# Patient Record
Sex: FEMALE | Race: WHITE | NOT HISPANIC OR LATINO | Employment: UNEMPLOYED | URBAN - METROPOLITAN AREA
[De-identification: names, ages, dates, MRNs, and addresses within clinical notes are randomized per-mention and may not be internally consistent; named-entity substitution may affect disease eponyms.]

---

## 2018-01-11 ENCOUNTER — HISTORICAL (OUTPATIENT)
Dept: RADIOLOGY | Facility: HOSPITAL | Age: 58
End: 2018-01-11

## 2018-01-22 ENCOUNTER — HISTORICAL (OUTPATIENT)
Dept: INTERNAL MEDICINE | Facility: CLINIC | Age: 58
End: 2018-01-22

## 2018-02-20 ENCOUNTER — HISTORICAL (OUTPATIENT)
Dept: RADIOLOGY | Facility: HOSPITAL | Age: 58
End: 2018-02-20

## 2018-05-04 ENCOUNTER — HISTORICAL (OUTPATIENT)
Dept: ENDOSCOPY | Facility: HOSPITAL | Age: 58
End: 2018-05-04

## 2018-06-11 ENCOUNTER — HISTORICAL (OUTPATIENT)
Dept: INFUSION THERAPY | Facility: HOSPITAL | Age: 58
End: 2018-06-11

## 2018-06-18 ENCOUNTER — HISTORICAL (OUTPATIENT)
Dept: INFUSION THERAPY | Facility: HOSPITAL | Age: 58
End: 2018-06-18

## 2018-06-22 ENCOUNTER — HISTORICAL (OUTPATIENT)
Dept: INFUSION THERAPY | Facility: HOSPITAL | Age: 58
End: 2018-06-22

## 2022-01-03 ENCOUNTER — HISTORICAL (OUTPATIENT)
Dept: ADMINISTRATIVE | Facility: HOSPITAL | Age: 62
End: 2022-01-03

## 2022-01-03 LAB — SARS-COV-2 RNA RESP QL NAA+PROBE: NEGATIVE

## 2022-03-08 ENCOUNTER — HISTORICAL (OUTPATIENT)
Dept: ADMINISTRATIVE | Facility: HOSPITAL | Age: 62
End: 2022-03-08

## 2022-04-10 ENCOUNTER — HISTORICAL (OUTPATIENT)
Dept: ADMINISTRATIVE | Facility: HOSPITAL | Age: 62
End: 2022-04-10

## 2022-04-25 VITALS
DIASTOLIC BLOOD PRESSURE: 81 MMHG | BODY MASS INDEX: 28.8 KG/M2 | HEIGHT: 61 IN | SYSTOLIC BLOOD PRESSURE: 125 MMHG | OXYGEN SATURATION: 98 % | WEIGHT: 152.56 LBS

## 2022-05-03 NOTE — HISTORICAL [facility]
This is a historical note converted from [employer]. Formatting and pictures may have been removed.  Please reference [employer] for original formatting and attached multimedia. UHOP?(Verified)  ?  DATE OF SURGERY: ? ?05/04/2018  ?  SURGEON: ?Dr. Lissett Gilbert III, MD, MD  ?  RESIDENT SURGEON:??Shivani Mcneill MD, PGY2  ?  PREOPERATIVE DIAGNOSIS: ?  1.? Gastroesophageal reflux disease.  2.? Hematemesis.  ?  POSTOPERATIVE DIAGNOSES: ?  1.??Reflux esophagitis.  2.??Erosive gastritis.  ?  PROCEDURE PERFORMED: ?Esophagogastroduodenoscopy with biopsies.  ?  ANESTHESIA: ?Sedation with propofol.  ?  INDICATION FOR PROCEDURE: ?The patient is a 57-year-old female who?was referred?for upper endoscopy to evaluate symptoms of gastroesophageal reflux and hematemesis.  ?  PROCEDURE IN DETAIL: ?After appropriate informed consent had been obtained and all questions had been answered, the patient was brought to the procedure room. ?She was connected to the appropriate monitoring devices. ?A time-out was performed verifying correct patient and procedure. ?The patient was then placed in the left lateral decubitus position. ?A bite block was placed. ?IV sedation was administered by the anesthesia team in divided doses throughout the procedure. ?Once the appropriate level of sedation had been achieved, the procedure was begun. ?A well-lubricated endoscope was inserted into the mouth and advanced over the tongue and through the esophagus, stomach, and duodenum under direct visualization. ?The scope was advanced to the second portion of the duodenum where the ampulla was visualized. ?The scope was then slowly withdrawn while inspecting the color, texture, and integrity of the mucosa. ?No duodenal mucosal abnormalities were seen. ?The scope was then withdrawn through the pylorus. ?The patient was noted to have some erosive gastritis in area of the antrum. ?Cold biopsies were taken of this area. ?The scope was then retroflexed.??She had a very small  hiatal hernia but otherwise no abnormalities. ?The scope was then withdrawn through the GE junction. ?The Z-line appeared normal. ?There was some esophagitis of the distal esophagus. ?Biopsies of this area?were taken using cold biopsy forceps.??No other esophageal abnormalities were seen.? The scope was then fully withdrawn and the procedure complete. ?The patient was awakened from anesthesia and transferred to the recovery room in stable condition.  ?  COMPLICATIONS: ?None.  ?  ESTIMATED BLOOD LOSS: ?Minimal.  ?  SPECIMENS: ?  1.??Cold biopsies of stomach antrum.  2.??Cold biopsies of distal esophagus.  ?  FOLLOWUP:??Patient does not need repeat upper endoscopy unless clinically indicated. Depending on biopsy results, she may require treatment of H. pylori.  ?  ?

## 2022-05-03 NOTE — HISTORICAL [facility]
This is a historical note converted from [employer]. Formatting and pictures may have been removed.  Please reference [employer] for original formatting and attached multimedia. UHOP?(Verified)  ?  DATE OF SURGERY: ? ?05/04/2018  ?  ATTENDING PHYSICIAN:??Dr. Lissett Gilbert III, MD, MD  ?  RESIDENT SURGEON: ?Shivani Mcneill MD, PGY-2  ?  PREOPERATIVE DIAGNOSIS: ?  1.? History of colon polyps.  2.? Hematochezia.  ?  POSTOPERATIVE DIAGNOSES: ?  1.??Colon polyps.  2.??Diverticulosis.  ?  PROCEDURE PERFORMED:??Colonoscopy with snare polypectomy.  ?  INDICATION FOR PROCEDURE: ?The patient is a 57-year-old female who has a personal history of colon polyps found on colonoscopy?over 10?years ago.??She has not had repeat colonoscopy since then.? She reports some occasional?bright red blood per rectum but otherwise no changes in bowel habits.? She denies any known family history of colon cancer.  ?  ANESTHESIA: ?Sedation with propofol. ?  ?  BOWEL PREPARATION:??Good.  ?  PROCEDURE IN DETAIL: ?After appropriate informed consent had been obtained and all questions had been answered, the patient was brought to the procedure room. ?She was connected to the appropriate monitoring devices. ?A time-out was performed verifying correct patient and procedure. ?Oxygen was administered to the patient continuously via nasal cannula. ?She was then placed in the left lateral decubitus position. ?IV sedation was administered by the anesthesia team in divided dosages throughout the procedure. ?Once the appropriate level of sedation had been achieved, the procedure was begun. ?A digital rectal exam was performed. ?No masses were felt and there was no gross blood. ?A well-lubricated colonoscope was then inserted into the rectum and advanced under direct visualization to the level of the cecum. ?The cecum was identified by seeing the appendiceal orifice and ileocecal valve. ?The scope was then slowly withdrawn while inspecting the color, texture, and  integrity of the mucosa from the cecum to the anal canal. ?The patient had a 5 mm polyp within the cecum that was removed using cold snare.? She had an additional 5 mm polyp within the transverse colon that was also removed using cold snare.? The patient had numerous diverticula in the descending colon as well. ?Upon reaching the rectum, the scope was retroflexed.? No polyps, masses, or other abnormalities of the distal rectum were seen.? The scope was then fully withdrawn and the procedure complete. ?The patient was awakened from anesthesia and transferred to the recovery room in stable condition.  ?  COMPLICATIONS: ?None.  ?  ESTIMATED BLOOD LOSS:??Minimal.  ?  SPECIMENS:  1.? Cecal polyp.  2.? Transverse colon polyp.  ?  FOLLOWUP: ?The patient will require repeat colonoscopy in 5 years.  ?

## 2023-04-17 DIAGNOSIS — R11.0 NAUSEA: ICD-10-CM

## 2023-04-17 DIAGNOSIS — K21.9 GASTROESOPHAGEAL REFLUX DISEASE, UNSPECIFIED WHETHER ESOPHAGITIS PRESENT: Primary | ICD-10-CM

## 2023-07-05 DIAGNOSIS — K92.1 HEMATOCHEZIA: Primary | ICD-10-CM

## 2023-07-05 DIAGNOSIS — K29.71 GASTRITIS WITH HEMORRHAGE, UNSPECIFIED CHRONICITY, UNSPECIFIED GASTRITIS TYPE: ICD-10-CM

## 2024-01-08 ENCOUNTER — OFFICE VISIT (OUTPATIENT)
Dept: URGENT CARE | Facility: CLINIC | Age: 64
End: 2024-01-08
Payer: MEDICAID

## 2024-01-08 VITALS
SYSTOLIC BLOOD PRESSURE: 124 MMHG | HEART RATE: 87 BPM | WEIGHT: 164 LBS | HEIGHT: 62 IN | OXYGEN SATURATION: 98 % | TEMPERATURE: 100 F | BODY MASS INDEX: 30.18 KG/M2 | DIASTOLIC BLOOD PRESSURE: 80 MMHG | RESPIRATION RATE: 16 BRPM

## 2024-01-08 DIAGNOSIS — U07.1 COVID-19 VIRUS DETECTED: ICD-10-CM

## 2024-01-08 DIAGNOSIS — U07.1 COVID-19: Primary | ICD-10-CM

## 2024-01-08 DIAGNOSIS — R09.89 SYMPTOMS OF UPPER RESPIRATORY INFECTION (URI): ICD-10-CM

## 2024-01-08 LAB
CTP QC/QA: YES
MOLECULAR STREP A: NEGATIVE
POC MOLECULAR INFLUENZA A AGN: NEGATIVE
POC MOLECULAR INFLUENZA B AGN: NEGATIVE
SARS-COV-2 RDRP RESP QL NAA+PROBE: POSITIVE

## 2024-01-08 PROCEDURE — 87502 INFLUENZA DNA AMP PROBE: CPT | Mod: PBBFAC | Performed by: FAMILY MEDICINE

## 2024-01-08 PROCEDURE — 87635 SARS-COV-2 COVID-19 AMP PRB: CPT | Mod: PBBFAC | Performed by: FAMILY MEDICINE

## 2024-01-08 PROCEDURE — 99213 OFFICE O/P EST LOW 20 MIN: CPT | Mod: PBBFAC | Performed by: FAMILY MEDICINE

## 2024-01-08 PROCEDURE — 99203 OFFICE O/P NEW LOW 30 MIN: CPT | Mod: S$PBB,,, | Performed by: FAMILY MEDICINE

## 2024-01-08 PROCEDURE — 87651 STREP A DNA AMP PROBE: CPT | Mod: PBBFAC | Performed by: FAMILY MEDICINE

## 2024-01-08 RX ORDER — HYDROCODONE BITARTRATE AND ACETAMINOPHEN 5; 325 MG/1; MG/1
1 TABLET ORAL EVERY 6 HOURS PRN
Qty: 12 TABLET | Refills: 0 | Status: SHIPPED | OUTPATIENT
Start: 2024-01-08

## 2024-01-08 NOTE — PROGRESS NOTES
"Subjective:       Patient ID: Sherry Hampton is a 63 y.o. female.    Vitals:  height is 5' 2" (1.575 m) and weight is 74.4 kg (164 lb). Her temperature is 99.5 °F (37.5 °C). Her blood pressure is 124/80 and her pulse is 87. Her respiration is 16 and oxygen saturation is 98%.     Chief Complaint: URI (Hoarse, sneezing, runny nose since last night. Bilat leg pain (states legs hurts post starting new job x 2 days.))    URI   The current episode started yesterday. Associated symptoms include congestion and sneezing. Pertinent negatives include no abdominal pain, chest pain, coughing, diarrhea, neck pain, rash, vomiting or wheezing. Associated symptoms comments: Myalgias, states she thinks it is from work..         HENT:  Positive for congestion. Negative for ear discharge, drooling, facial swelling and trouble swallowing.    Neck: Negative for neck pain and neck stiffness.   Cardiovascular:  Negative for chest pain.   Respiratory:  Negative for cough, bloody sputum, shortness of breath and wheezing.    Gastrointestinal:  Negative for abdominal pain, vomiting and diarrhea.   Skin:  Negative for rash.   Allergic/Immunologic: Positive for sneezing.       Objective:   Physical Exam   Constitutional: She appears well-developed.  Non-toxic appearance. She does not appear ill. No distress.   HENT:   Head: Atraumatic.   Nose: No purulent discharge. Right sinus exhibits no maxillary sinus tenderness and no frontal sinus tenderness. Left sinus exhibits no maxillary sinus tenderness and no frontal sinus tenderness.   Mouth/Throat: Uvula is midline.   Eyes: Right eye exhibits no discharge. Left eye exhibits no discharge. Extraocular movement intact   Neck: Neck supple. No neck rigidity present.   Cardiovascular: Regular rhythm.   Pulmonary/Chest: Effort normal and breath sounds normal. No respiratory distress. She has no wheezes. She has no rales.   Musculoskeletal: Normal range of motion.         General: No swelling, " tenderness, deformity or signs of injury. Normal range of motion.      Right lower leg: No edema.      Left lower leg: No edema.   Lymphadenopathy:     She has no cervical adenopathy.   Neurological: She is alert.   Skin: Skin is warm, dry and not diaphoretic.   Psychiatric: Her behavior is normal.   Nursing note and vitals reviewed.      Results for orders placed or performed in visit on 01/08/24   POCT COVID-19 Rapid Screening   Result Value Ref Range    POC Rapid COVID Positive (A) Negative     Acceptable Yes    POCT Influenza A/B Molecular   Result Value Ref Range    POC Molecular Influenza A Ag Negative Negative, Not Reported    POC Molecular Influenza B Ag Negative Negative, Not Reported     Acceptable Yes    POCT Strep A, Molecular   Result Value Ref Range    Molecular Strep A, POC Negative Negative     Acceptable Yes        Assessment:     1. COVID-19    2. Symptoms of upper respiratory infection (URI)          Plan:   Not interested in Paxlovid. Discussed COVID-19 isolation instructions, contagious precautions, ER precautions.  Use medications as needed for symptoms.  Agreed to prescribe a few Norco as requested.  She states she is tolerated this in the past.   reviewed, no records available.    Please follow instructions on patient education material. Seek care immediately if new or worsening symptoms develop.    COVID-19    Symptoms of upper respiratory infection (URI)  -     POCT COVID-19 Rapid Screening  -     POCT Influenza A/B Molecular  -     POCT Strep A, Molecular    Other orders  -     HYDROcodone-acetaminophen (NORCO) 5-325 mg per tablet; Take 1 tablet by mouth every 6 (six) hours as needed for Pain.  Dispense: 12 tablet; Refill: 0        Please note: This chart was completed via voice to text dictation. It may contain typographical/word recognition errors. If there are any questions, please contact the provider for final clarification.

## 2024-01-08 NOTE — LETTER
January 8, 2024      [facility]  [address] [address]  Phone: [phone]       Patient: Sherry Hampton   YOB: 1960  Date of Visit: 01/08/2024    To Whom It May Concern:    Kelly Hampton  was at [facility] on 01/08/2024. The patient may return to work/school on 1/13/24 with no restrictions. If you have any questions or concerns, or if I can be of further assistance, please do not hesitate to contact me.    Sincerely,    JENNIFER Gomez MD

## 2024-01-10 ENCOUNTER — OFFICE VISIT (OUTPATIENT)
Dept: URGENT CARE | Facility: CLINIC | Age: 64
End: 2024-01-10
Payer: MEDICAID

## 2024-01-10 VITALS
WEIGHT: 164 LBS | RESPIRATION RATE: 20 BRPM | BODY MASS INDEX: 30.18 KG/M2 | DIASTOLIC BLOOD PRESSURE: 66 MMHG | HEART RATE: 83 BPM | HEIGHT: 62 IN | SYSTOLIC BLOOD PRESSURE: 95 MMHG | TEMPERATURE: 99 F | OXYGEN SATURATION: 97 %

## 2024-01-10 DIAGNOSIS — R09.81 NASAL CONGESTION: Primary | ICD-10-CM

## 2024-01-10 DIAGNOSIS — J02.9 SORE THROAT: ICD-10-CM

## 2024-01-10 DIAGNOSIS — R05.9 COUGH IN ADULT PATIENT: ICD-10-CM

## 2024-01-10 PROCEDURE — 99204 OFFICE O/P NEW MOD 45 MIN: CPT | Mod: S$PBB,,,

## 2024-01-10 PROCEDURE — 99214 OFFICE O/P EST MOD 30 MIN: CPT | Mod: PBBFAC

## 2024-01-10 RX ORDER — PROMETHAZINE HYDROCHLORIDE AND DEXTROMETHORPHAN HYDROBROMIDE 6.25; 15 MG/5ML; MG/5ML
5 SYRUP ORAL NIGHTLY PRN
Qty: 50 ML | Refills: 0 | Status: SHIPPED | OUTPATIENT
Start: 2024-01-10 | End: 2024-01-20

## 2024-01-10 RX ORDER — FLUTICASONE PROPIONATE 50 MCG
1 SPRAY, SUSPENSION (ML) NASAL DAILY
Qty: 11.1 ML | Refills: 0 | Status: SHIPPED | OUTPATIENT
Start: 2024-01-10

## 2024-01-10 RX ORDER — PHENOL 1.4 %
AEROSOL, SPRAY (ML) MUCOUS MEMBRANE
Qty: 177 ML | Refills: 0 | Status: SHIPPED | OUTPATIENT
Start: 2024-01-10

## 2024-01-10 NOTE — PROGRESS NOTES
"Subjective:       Patient ID: Sherry Hampton is a 63 y.o. female.    Vitals:  height is 5' 2" (1.575 m) and weight is 74.4 kg (164 lb). Her oral temperature is 98.5 °F (36.9 °C). Her blood pressure is 95/66 and her pulse is 83. Her respiration is 20 and oxygen saturation is 97%.     Chief Complaint: Covid (Patient diagnosed with Covid on 1/8/2024. Complains of body aches, headache and sore throat)    63-year-old white female presents to clinic alone.  Reports diagnosed with COVID 2 days ago, has been taking Norco/Ibuprofen with minimal improvement.  Reports infrequent productive coughing with rib pain produced with cough.          HENT:  Positive for congestion and sore throat.    Neck: Negative for neck pain.   Cardiovascular:  Negative for palpitations and sob on exertion.   Respiratory:  Positive for cough and sputum production. Negative for shortness of breath and asthma.    Musculoskeletal:  Positive for back pain and muscle ache.   Allergic/Immunologic: Negative for asthma.       Objective:      Physical Exam   Constitutional: She is oriented to person, place, and time. She is cooperative. She is easily aroused. She does not appear ill. No distress.      Comments:Skin turgor elastic    awake  HENT:   Head: Normocephalic and atraumatic.   Ears:   Right Ear: A middle ear effusion is present.   Left Ear: Tympanic membrane normal.   Nose: Mucosal edema and rhinorrhea present. Right sinus exhibits no maxillary sinus tenderness and no frontal sinus tenderness. Left sinus exhibits no maxillary sinus tenderness and no frontal sinus tenderness.   Mouth/Throat: Mucous membranes are normal. Abnormal dentition. Dental caries present. Posterior oropharyngeal erythema present. Tonsils are 1+ on the right. Tonsils are 1+ on the left. No tonsillar exudate.   Eyes: Conjunctivae and lids are normal.   Neck: Neck supple.      Comments: Left submandibular pain with palpation   Cardiovascular: Normal rate.   Pulmonary/Chest: " Effort normal and breath sounds normal.   Abdominal: Normal appearance.   Musculoskeletal:      Right lower leg: No edema.      Left lower leg: No edema.   Lymphadenopathy:     She has no cervical adenopathy.   Neurological: no focal deficit. She is alert, oriented to person, place, and time and easily aroused. Gait normal. GCS eye subscore is 4. GCS verbal subscore is 5. GCS motor subscore is 6.   Skin: Skin is warm, dry and intact. Capillary refill takes less than 2 seconds.   Psychiatric: Her speech is normal. Her mood appears anxious. Her affect is tearful.   Nursing note and vitals reviewed.        Assessment:       1. Nasal congestion    2. Cough in adult patient    3. Sore throat          Plan:     Patient has COVID, instructed patient to refrain from any NSAIDs regarding extensive GI history.  We will prescribe nightly cough syrup to help with coughing symptoms and sleep difficulties.  Instructed patient to increase her oral fluid intake, may use saltwater rinses, encouraged to drink warm teas with honey and lemon.  Follow up with PCP in 7 days if symptoms worsen.    Nasal congestion  -     fluticasone propionate (FLONASE) 50 mcg/actuation nasal spray; 1 spray (50 mcg total) by Each Nostril route once daily.  Dispense: 11.1 mL; Refill: 0    Cough in adult patient  -     promethazine-dextromethorphan (PROMETHAZINE-DM) 6.25-15 mg/5 mL Syrp; Take 5 mLs by mouth nightly as needed (nightly cough).  Dispense: 50 mL; Refill: 0    Sore throat  -     phenoL (CHLORASEPTIC THROAT SPRAY) 1.4 % SprA; by Mucous Membrane route every 2 (two) hours.  Dispense: 177 mL; Refill: 0

## 2024-02-08 DIAGNOSIS — M25.511 RIGHT ANTERIOR SHOULDER PAIN: Primary | ICD-10-CM

## 2024-02-24 ENCOUNTER — OFFICE VISIT (OUTPATIENT)
Dept: URGENT CARE | Facility: CLINIC | Age: 64
End: 2024-02-24
Payer: MEDICAID

## 2024-02-24 VITALS
SYSTOLIC BLOOD PRESSURE: 94 MMHG | HEART RATE: 92 BPM | TEMPERATURE: 98 F | WEIGHT: 165.13 LBS | OXYGEN SATURATION: 97 % | DIASTOLIC BLOOD PRESSURE: 55 MMHG | BODY MASS INDEX: 30.39 KG/M2 | RESPIRATION RATE: 20 BRPM | HEIGHT: 62 IN

## 2024-02-24 DIAGNOSIS — R05.9 COUGH, UNSPECIFIED TYPE: Primary | ICD-10-CM

## 2024-02-24 PROCEDURE — 99213 OFFICE O/P EST LOW 20 MIN: CPT | Mod: S$PBB,,, | Performed by: FAMILY MEDICINE

## 2024-02-24 PROCEDURE — 99214 OFFICE O/P EST MOD 30 MIN: CPT | Mod: PBBFAC | Performed by: FAMILY MEDICINE

## 2024-02-24 RX ORDER — DULOXETIN HYDROCHLORIDE 30 MG/1
CAPSULE, DELAYED RELEASE ORAL
COMMUNITY
End: 2024-02-28

## 2024-02-24 RX ORDER — PROMETHAZINE HYDROCHLORIDE AND DEXTROMETHORPHAN HYDROBROMIDE 6.25; 15 MG/5ML; MG/5ML
5 SYRUP ORAL
Qty: 120 ML | Refills: 0 | Status: SHIPPED | OUTPATIENT
Start: 2024-02-24 | End: 2024-02-28

## 2024-02-24 RX ORDER — DICLOFENAC SODIUM 75 MG/1
TABLET, DELAYED RELEASE ORAL
COMMUNITY
End: 2024-02-28

## 2024-02-24 RX ORDER — HYDROXYZINE PAMOATE 25 MG/1
CAPSULE ORAL
COMMUNITY
End: 2024-02-28

## 2024-02-24 NOTE — PROGRESS NOTES
"Subjective:       Patient ID: Sherry Hampton is a 63 y.o. female.    Vitals:  height is 5' 2" (1.575 m) and weight is 74.9 kg (165 lb 2 oz). Her temperature is 98.3 °F (36.8 °C). Her blood pressure is 94/55 (abnormal) and her pulse is 92. Her respiration is 20 and oxygen saturation is 97%.     Chief Complaint: Cough (   X 4 days            COVID + X 1MO AGO    STATES ONLY COUGHS AT NIGHT, REFUSES TESTING)    Recent COVID-19, fully recovered, only cough continues.  No chest pain or shortness of breath, no wheezing.  She is moving to [place] and is requesting a refill on Phenergan DM.  Chart reviewed    Cough  The cough is Non-productive. Pertinent negatives include no chest pain, fever, hemoptysis, nasal congestion, postnasal drip, rash, rhinorrhea, sore throat, shortness of breath or wheezing.         Constitution: Negative for fever.   HENT:  Negative for postnasal drip and sore throat.    Cardiovascular:  Negative for chest pain.   Respiratory:  Positive for cough. Negative for bloody sputum, shortness of breath and wheezing.    Skin:  Negative for rash.       Objective:   Physical Exam   Constitutional: She appears well-developed.  Non-toxic appearance. She does not appear ill. No distress.   HENT:   Head: Atraumatic.   Nose: No rhinorrhea or purulent discharge. Right sinus exhibits no maxillary sinus tenderness and no frontal sinus tenderness. Left sinus exhibits no maxillary sinus tenderness and no frontal sinus tenderness.   Mouth/Throat: Uvula is midline.   Eyes: Right eye exhibits no discharge. Left eye exhibits no discharge. Extraocular movement intact   Neck: Neck supple. No neck rigidity present.   Cardiovascular: Regular rhythm.   Pulmonary/Chest: Effort normal and breath sounds normal. No stridor. No respiratory distress. She has no wheezes. She has no rhonchi. She has no rales.   Lymphadenopathy:     She has no cervical adenopathy.   Neurological: She is alert.   Skin: Skin is warm, dry and not " diaphoretic.   Psychiatric: Her behavior is normal.   Nursing note and vitals reviewed.        Assessment:     1. Cough, unspecified type          Plan:   Discussed what is likely a postviral cough.  Patient in no acute distress, vitals are stable, lungs are clear.  Agreed to refill Phenergan DM with side effect precautions.  Follow-up with providers in [place]    Cough, unspecified type  -     promethazine-dextromethorphan (PROMETHAZINE-DM) 6.25-15 mg/5 mL Syrp; Take 5 mLs by mouth every 6 to 8 hours as needed (cough). May cause sedation.  Do not drive or operate heavy machinery.  Dispense: 120 mL; Refill: 0        Please note: This chart was completed via voice to text dictation. It may contain typographical/word recognition errors. If there are any questions, please contact the provider for final clarification.

## 2024-02-28 ENCOUNTER — HOSPITAL ENCOUNTER (OUTPATIENT)
Dept: RADIOLOGY | Facility: HOSPITAL | Age: 64
Discharge: HOME | End: 2024-02-28
Attending: STUDENT IN AN ORGANIZED HEALTH CARE EDUCATION/TRAINING PROGRAM
Payer: MEDICAID

## 2024-02-28 ENCOUNTER — OFFICE VISIT (OUTPATIENT)
Dept: ORTHOPEDICS | Facility: CLINIC | Age: 64
End: 2024-02-28
Payer: MEDICAID

## 2024-02-28 VITALS
HEIGHT: 62 IN | DIASTOLIC BLOOD PRESSURE: 66 MMHG | HEART RATE: 68 BPM | SYSTOLIC BLOOD PRESSURE: 129 MMHG | WEIGHT: 165 LBS | BODY MASS INDEX: 30.36 KG/M2

## 2024-02-28 DIAGNOSIS — M25.511 RIGHT SHOULDER PAIN, UNSPECIFIED CHRONICITY: ICD-10-CM

## 2024-02-28 DIAGNOSIS — M75.101 ROTATOR CUFF SYNDROME OF RIGHT SHOULDER: Primary | ICD-10-CM

## 2024-02-28 PROCEDURE — 99213 OFFICE O/P EST LOW 20 MIN: CPT | Mod: PBBFAC,25

## 2024-02-28 PROCEDURE — 73030 X-RAY EXAM OF SHOULDER: CPT | Mod: TC,RT

## 2024-02-28 PROCEDURE — 20610 DRAIN/INJ JOINT/BURSA W/O US: CPT | Mod: PBBFAC,RT | Performed by: STUDENT IN AN ORGANIZED HEALTH CARE EDUCATION/TRAINING PROGRAM

## 2024-02-28 RX ORDER — MELOXICAM 15 MG/1
15 TABLET ORAL DAILY
Qty: 30 TABLET | Refills: 0 | Status: SHIPPED | OUTPATIENT
Start: 2024-02-28 | End: 2024-03-29

## 2024-02-28 RX ORDER — LIDOCAINE HYDROCHLORIDE 10 MG/ML
10 INJECTION, SOLUTION EPIDURAL; INFILTRATION; INTRACAUDAL; PERINEURAL
Status: ACTIVE | OUTPATIENT
Start: 2024-02-28

## 2024-02-28 RX ORDER — TRIAMCINOLONE ACETONIDE 40 MG/ML
40 INJECTION, SUSPENSION INTRA-ARTICULAR; INTRAMUSCULAR ONCE
Status: COMPLETED | OUTPATIENT
Start: 2024-02-28 | End: 2024-02-28

## 2024-02-28 RX ADMIN — TRIAMCINOLONE ACETONIDE 40 MG: 40 INJECTION, SUSPENSION INTRA-ARTICULAR; INTRAMUSCULAR at 02:02

## 2024-02-28 NOTE — PROGRESS NOTES
"Subjective:    Patient ID: Sherry Hampton is a right handed 63 y.o. female  who presented to [facility] for new visit..      Chief Complaint: Pain of the Right Shoulder      History of Present Illness:  HPI    Sherry Hampton who has a history of right rotator cuff syndrome  presented today for Chronic right shoulder pain present for 10 years. Pain is located in the anterior shoulder and radiates to the posterior shoulder. 4/10 in intensity, described as dull and aching. Worse with overhead movements and repetitive back and forth movements. She is tried Tylenol and ibuprofen, heating pad, topical Voltaren for the last 3 months with no improvement.     Shoulder Review of Systems:  Swelling?  no  Instability?  no  Clicking?  no  Limited ROM? no  Fever/Chills? no  Subluxation? no  Dislocation? no       Objective:      Physical Exam:    /66   Pulse 68   Ht 5' 2" (1.575 m)   Wt 74.8 kg (165 lb)   BMI 30.18 kg/m²     Ortho/SPM Exam    Appearance:  Soft tissue swelling: Left: no Right: no  Effusion: Left:  Negative Right: Negative  Erythema: Left no Right: no  Ecchymosis: Left: no Right: no  Atrophy: Left: no Right: no  Scapular winging: Left: no Right: no    Palpation:  Shoulder Tenderness: Left: none  Right: AC joint, biceps tendon, lateral acromion    Range of motion:  Flexion (0-90): Left:  90 Right: 90  Abduction (0-180): Left:  180 Right: 180  External rotation (0-55): Left: 55 Right: 55  Internal rotation (0-45): Left: 45 Right: 45    Strength:  Abduction: Left: 5/5 Pain: no Right: 5/5 Pain: yes  External rotation: Left: 5/5 Pain: no Right: 5/5 Pain: yes  Internal rotation: Left: 5/5 Pain: no Right: 5/5 Pain: no  Elbow flexion: Left: 5/5 Pain: no Right: 5/5 Pain: no  Elbow extension: Left: 5/5 Pain: no Right: 5/5 Pain: no    Special Tests:  Subacromial Impingement  Neer: Left: Negative Right: Positive  Osuna: Left: Negative Right: Negative    AC " Joint Arthritis:  Cross-body abduction: Left: Negative Right: Negative    Rotator Cuff Tear   Drop arm test: Left: Negative Right: Negative  Hornblower: Left: Left: Not performed Right: Positive   Belly press test: Left: Negative Right: Negative  Caitlin test (Empty can): Left: Negative Right: Positive    Biceps tendon/Labral tear  Speed's: Left: Negative Right: Negative  Yergason's: Left: Negative Right: Negative  O'Federico's: Left: Negative Right: Negative  Cranck test: Left: Negative Right: Negative    Stability   Sulcus sign: Left: Not performed Right: Not performed   Apprehension test: Left: Not performed Right: Not performed   Relocation test: Left: Not performed Right: Not performed     Cervical   Spurling: Left: Negative Right: Negative    AIN/PIN/Ulnar nerve: Intact and symmetric    General appearance: NAD  Peripheral pulses: normal bilaterally   Reflexes: Left: Not performed Right: Not performed   Sensation: normal    Labs:  Last A1c: The patient doesn't have any registry metric data available     Imaging:   Previous images reviewed.  X-rays ordered and performed today: yes  # of views: 4 Laterality: right  My Interpretation:  Mild acromioclavicular and glenohumeral arthrosis.    Assessment:        Encounter Diagnoses   Code Name Primary?    M75.101 Rotator cuff syndrome of right shoulder Yes        Plan:      MDM: Prior external referring provider notes reviewed. Prior external referring provider studies reviewed.   Dx: Right rotator cuff syndrome. Chronic in moderate exacerbation.  Treatment Plan: Discussed with patient diagnosis and treatment recommendations.   - Recommend conservative management, patient is amenable.  -patient amenable to SA/SD CSI  -recommend PT, formal script provided today.  -start Mobic  Imaging: radiological studies ordered and independently reviewed; discussed with patient; pending radiologist interpretation.   Procedure: Discussed CSI injections as treatment options; since  conservative measures did not improve symptoms patient consented for CSI today.  Therapy: Physical Therapy  Medication: START meloxicam (Mobic 15 mg daily). Please see your primary care physician for further refills.  RTC: 6 weeks.         Large Joint Aspiration/Injection: R subacromial bursa    Date/Time: 2/28/2024 10:50 AM    Performed by: Simone De Jesus MD  Authorized by: Simone De Jesus MD    Consent Done?:  Yes (Written)  Indications:  Arthritis  Site marked: the procedure site was marked    Timeout: prior to procedure the correct patient, procedure, and site was verified      Local anesthesia used?: Yes    Local anesthetic:  Topical anesthetic    Details:  Needle Size:  21 G  Approach:  Anterolateral  Location:  Shoulder  Site:  R subacromial bursa  Patient tolerance:  Patient tolerated the procedure well with no immediate complications     Staff: Hayes Simon MD    Risks:  Possible complications with the injection include bleeding, infection (.01%), tendon rupture, steroid flare, fat pad or soft tissue atrophy, skin depigmentation, allergic reaction to medications and vasovagal response. (steroid flare treatment is rest, ice, NSAIDs and resolves in 24-36 hours.)    Consent:  No absolute contraindications (cellulitis overlying joint, infection, lack of informed consent, allergy to injection medication, AVN protein or egg allergy for sodium hyaluronate, or history of steroid flare) or relative contraindications (uncontrolled DM2 A1c>10, coagulopathy, INR > 3.5, previous joint replacement or history of AVN).        Description:  The patient was prepped in normal sterile fashion use of chlorhexidine scrub and the appropriate and anatomic landmarks were identified with ultrasound.  Contents of syringe included: 5cc of 1% of lidocaine with 40mg of Kenalog     Post Procedure: Patient alert, and moving all extremities. ROM improved, pain decreased.  Good peripheral pulses, no signs of vascular compromise and range  of motion intact.  Aftercare instructions were given to patient at time of discharge.  Relative rest for 3 days-avoiding excess activity.  Place ice on the area for 15 minutes every 4-6 hours. Patient may take Tylenol a 1000 mg b.i.d. or ibuprofen 600 mg t.i.d. for the next 3-4 days if not on medication already and safe to take pending co-morbidities.  Protect the area for the next 1-8 hours if anesthetic was used.  Avoid excessive activity for the next 3-4 weeks.  ER precautions given for fever, severe joint pain or allergic reaction or other new symptoms related to the joint injection.       Patient had immediate improvement in symptoms.

## 2024-03-21 NOTE — PROGRESS NOTES
Faculty Attestation: Sherry Hampton  was seen in [facility]. Discussed with Dr. De Jesus at the time of the visit. History of Present Illness, Physical Exam, and Assessment and Plan reviewed. Treatment plan is reasonable and appropriate. Compliance with treatment recommendations is important.  Radiology images independently reviewed and agree with radiologist interpretation.  Procedure note reviewed. Patient tolerated procedure well.      Hayes Simon MD  Sports Medicine

## 2024-05-02 DIAGNOSIS — M72.2 PLANTAR FASCIITIS, BILATERAL: Primary | ICD-10-CM

## 2024-05-22 ENCOUNTER — OFFICE VISIT (OUTPATIENT)
Dept: URGENT CARE | Facility: CLINIC | Age: 64
End: 2024-05-22
Payer: MEDICAID

## 2024-05-22 ENCOUNTER — HOSPITAL ENCOUNTER (OUTPATIENT)
Dept: RADIOLOGY | Facility: HOSPITAL | Age: 64
Discharge: HOME | End: 2024-05-22
Attending: STUDENT IN AN ORGANIZED HEALTH CARE EDUCATION/TRAINING PROGRAM
Payer: MEDICAID

## 2024-05-22 ENCOUNTER — OFFICE VISIT (OUTPATIENT)
Dept: ORTHOPEDICS | Facility: CLINIC | Age: 64
End: 2024-05-22
Payer: MEDICAID

## 2024-05-22 VITALS
HEIGHT: 62 IN | TEMPERATURE: 98 F | HEART RATE: 85 BPM | DIASTOLIC BLOOD PRESSURE: 67 MMHG | RESPIRATION RATE: 18 BRPM | WEIGHT: 165.13 LBS | OXYGEN SATURATION: 98 % | BODY MASS INDEX: 30.39 KG/M2 | SYSTOLIC BLOOD PRESSURE: 114 MMHG

## 2024-05-22 VITALS
HEIGHT: 62 IN | WEIGHT: 165 LBS | SYSTOLIC BLOOD PRESSURE: 114 MMHG | BODY MASS INDEX: 30.36 KG/M2 | DIASTOLIC BLOOD PRESSURE: 61 MMHG | TEMPERATURE: 99 F

## 2024-05-22 DIAGNOSIS — M79.672 LEFT FOOT PAIN: ICD-10-CM

## 2024-05-22 DIAGNOSIS — M79.18 MUSCULOSKELETAL PAIN: Primary | ICD-10-CM

## 2024-05-22 DIAGNOSIS — M79.671 RIGHT FOOT PAIN: ICD-10-CM

## 2024-05-22 DIAGNOSIS — M72.2 PLANTAR FASCIITIS, BILATERAL: Primary | ICD-10-CM

## 2024-05-22 DIAGNOSIS — M19.072 OSTEOARTHRITIS OF JOINT OF TOE OF LEFT FOOT: ICD-10-CM

## 2024-05-22 PROCEDURE — 99213 OFFICE O/P EST LOW 20 MIN: CPT | Mod: PBBFAC,27 | Performed by: FAMILY MEDICINE

## 2024-05-22 PROCEDURE — 99214 OFFICE O/P EST MOD 30 MIN: CPT | Mod: PBBFAC

## 2024-05-22 PROCEDURE — 63600175 PHARM REV CODE 636 W HCPCS

## 2024-05-22 PROCEDURE — 99214 OFFICE O/P EST MOD 30 MIN: CPT | Mod: S$PBB,,, | Performed by: FAMILY MEDICINE

## 2024-05-22 RX ORDER — KETOROLAC TROMETHAMINE 30 MG/ML
30 INJECTION, SOLUTION INTRAMUSCULAR; INTRAVENOUS
Status: COMPLETED | OUTPATIENT
Start: 2024-05-22 | End: 2024-05-22

## 2024-05-22 RX ORDER — DICLOFENAC SODIUM 10 MG/G
2 GEL TOPICAL 4 TIMES DAILY
Qty: 100 G | Refills: 3 | Status: SHIPPED | OUTPATIENT
Start: 2024-05-22 | End: 2024-05-22

## 2024-05-22 RX ORDER — CYCLOBENZAPRINE HCL 5 MG
TABLET ORAL
COMMUNITY
Start: 2024-05-01

## 2024-05-22 RX ORDER — MELOXICAM 15 MG/1
15 TABLET ORAL DAILY PRN
COMMUNITY
Start: 2024-05-01

## 2024-05-22 RX ORDER — HYDROCODONE BITARTRATE AND ACETAMINOPHEN 5; 325 MG/1; MG/1
1 TABLET ORAL EVERY 6 HOURS PRN
Qty: 15 TABLET | Refills: 0 | Status: SHIPPED | OUTPATIENT
Start: 2024-05-22

## 2024-05-22 RX ORDER — HYDROCODONE BITARTRATE AND ACETAMINOPHEN 5; 325 MG/1; MG/1
1 TABLET ORAL EVERY 6 HOURS PRN
Qty: 15 TABLET | Refills: 0 | Status: SHIPPED | OUTPATIENT
Start: 2024-05-22 | End: 2024-05-22

## 2024-05-22 RX ORDER — DICLOFENAC SODIUM 10 MG/G
2 GEL TOPICAL 4 TIMES DAILY
Qty: 100 G | Refills: 3 | Status: SHIPPED | OUTPATIENT
Start: 2024-05-22

## 2024-05-22 RX ORDER — KETOROLAC TROMETHAMINE 30 MG/ML
30 INJECTION, SOLUTION INTRAMUSCULAR; INTRAVENOUS
Status: DISCONTINUED | OUTPATIENT
Start: 2024-05-22 | End: 2024-05-22

## 2024-05-22 RX ADMIN — KETOROLAC TROMETHAMINE 30 MG: 30 INJECTION, SOLUTION INTRAMUSCULAR; INTRAVENOUS at 05:05

## 2024-05-22 NOTE — PROGRESS NOTES
"Subjective:       Patient ID: Sherry Hampton is a 63 y.o. female.    Vitals:  height is 5' 2" (1.575 m) and weight is 74.9 kg (165 lb 1.6 oz). Her temperature is 98.1 °F (36.7 °C). Her blood pressure is 114/67 and her pulse is 85. Her respiration is 18 and oxygen saturation is 98%.     Chief Complaint: Pain (Generalized pain. Seen in ortho this am. States had decadron IM on 5/1.)    Patient presents to urgent Care requesting something for pain.  Has multiple musculoskeletal complaints, saw ortho this morning.  No new symptoms in that regard.  Mostly complaining of right groin pain.  States she has arthritis.    All other systems are negative    Chart reviewed    Objective:   Physical Exam   Constitutional: She appears well-developed.  Non-toxic appearance. She does not appear ill. No distress.   Abdominal: Normal appearance.   Musculoskeletal:      Right hip: She exhibits normal range of motion (negative SOFIA/FADIR), no bony tenderness and no deformity.      Right knee: Normal.      Right upper leg: Normal.   Neurological: no focal deficit. She is alert. She displays no weakness.   Skin: Skin is not diaphoretic. Capillary refill takes less than 2 seconds.   Psychiatric: Her behavior is normal. Mood normal.   Nursing note and vitals reviewed.        Assessment:     1. Musculoskeletal pain            Plan:   Patient denies known history of CKD.  Will give Toradol IM as requested.  Prescribed a few hydrocodone after reviewing .  Encouraged her to follow-up with PCP and ortho.  Return to urgent care if need      Musculoskeletal pain  -     ketorolac injection 30 mg    Other orders  -     Discontinue: ketorolac injection 30 mg  -     Discontinue: HYDROcodone-acetaminophen (NORCO) 5-325 mg per tablet; Take 1 tablet by mouth every 6 (six) hours as needed for Pain.  Dispense: 15 tablet; Refill: 0  -     HYDROcodone-acetaminophen (NORCO) 5-325 mg per tablet; Take 1 tablet by mouth every 6 (six) hours as needed for " Pain.  Dispense: 15 tablet; Refill: 0        Please note: This chart was completed via voice to text dictation. It may contain typographical/word recognition errors. If there are any questions, please contact the provider for final clarification.

## 2024-05-22 NOTE — PROGRESS NOTES
"Dictation #1  MRN:[id number]  [facility]:[id number] Subjective:    Patient ID: Sherry Hampton is a 63 y.o. female  who presented to [facility] for follow up.      Chief Complaint: Pain of the Right Foot and Pain of the Left Foot      History of Present Illness:    Sherry Hampton presented today with right heel pain present for 3 months. Right plantar heel pain present since March, pain is 7/10, worse with walking and standing, described as sharp.  Symptoms began after driving for 7 hours without cruise control. Her left foot pain has been present for over 2 years, is located under her left MTP, rated 2/10 severity, worse with walking. She saw her PCP who refilled Mobic, advised icing and stretching, supportive shoe inserts, avoidance of walking barefoot, over-the-counter night splints and sent a physical therapy script.  She has not started any of this treatment.     Ankle/Foot Review of Systems:  Swelling?  no  Instability?  no  Mechanical sx?  no  <30 min AM stiffness? no  Limited ROM? no  Fever/Chills? no       Objective:      Physical Exam:    /61   Temp 98.6 °F (37 °C)   Ht 5' 2" (1.575 m)   Wt 74.8 kg (165 lb)   BMI 30.18 kg/m²     Ortho/SPM Exam    Appearance:  antalgic  Wearing nonsupportive foot wear.  FWB    Soft tissue swelling: Left: no Right: no  Effusion: Left:  Negative Right: Negative  Erythema: Left no Right: no  Ecchymosis: Left: no Right: no  Atrophy: Left: no Right: no    Palpation:  Ankle/Foot Tenderness: Left: 1st MTP.  Right: plantar fascia origin/medial calcaneal tubercle.    Range of motion:  Lacking 5° of dorsiflexion of the left 1st MTP  Ankle dorsiflexion (20 degrees):     Left: 20 Right: 20  Ankle Plantar flexion (50 degrees): Left 50 Right: 50  Subtalar inversion (5 degrees): Left: 5 Right 5  Subtalar eversion (5 degrees):  Left: 5 Right 5  Transverse/midtarsal: adduction (20 degrees): Left: 20 Right: 20  Transverse/midtarsal " abduction (10 degrees): Left: 10 Right: 10    Strength:  Foot inversion/dorsiflexion (Deep Peroneal N. L4):Left: 5/5  Pain: no Right: 5/5  Pain: no  1st toe Dorsiflexion (Deep Peroneal N. L5): Left: 5/5  Pain: yes Right: 5/5  Pain: no  Foot plantarflexion (Tibial N. S1): Left: 5/5  Pain: no Right: 5/5  Pain: no  Foot eversion (superficial peroneal L4-S1): Left: 5/5  Pain: no Right: 5/5  Pain: no    Special Tests:  Mims:  Left: Not performed     Right: Not performed   Anterior drawer: Left: Negative     Right: Negative   Talar tilt: Left: Negative      Right: Negative   External rotation stress (Kleiger's): Left: Negative  Right: Negative  Eversion stress: Left: Negative Right: Negative   Squeeze: Left: Negative  Right: Negative  Heel rise: Left: Not performed Right: Not performed  Too many toes sign: Left: Not performed Right: Not performed    General appearance: NAD  Peripheral pulses: normal bilaterally   Reflexes: Left: normal Right normal   Sensation: normal    Imaging:   Previous images reviewed.  X-rays ordered and performed today: no    Assessment:        Encounter Diagnoses   Code Name Primary?    M72.2 Plantar fasciitis, bilateral Yes    M19.072 Osteoarthritis of joint of toe of left foot         Plan:      Dx: Right plantar fasciitis, left 1st toe MTP OA with acute severe exacerbation  Treatment Plan: Discussed with patient diagnosis, prognosis, and treatment recommendations. Education provided.  Discussed operative versus nonoperative management she prefers conservative management.Recommend conservative treatment to include: avoidance of aggravating activity, significant modification of daily activities, hot/cold therapies, topical and oral medications, braces, HEP/PT/OT, and injections.   Imaging: prior radiological studies independently reviewed; discussed with patient; agree with radiologist interpretation.   Weight Management: is paramount. recommend at least 10% of total body weight loss if your  bmi is 30-34.9. A bmi 24.9 or less may provide further relief..   Activity: Activity as tolerated; HEP to include aerobic conditioning and strength training with non-painful activity. ROM/STG exercises. Proper footware; assistive devises to avoid limping. Plantar fasciitis arch supports and plantar fasciitis night splints,, images shown to patient. Recommend using a frozen bottle water massage underneath the arch and heel of her feet twice a day 20 minutes on 20 minutes off.  Therapy: Physical Therapy; recommend HEP, demonstrated in office to patient.  Medication: START over-the-counter acetaminophen (Tylenol 1000 mg three times per day as needed)  START Voltaren Gel 1% as prescribed to affected area. Please see your primary care physician for further refills. She has a history of GI bleed, I cautioned her on the use of NSAIDs and advise her to follow up with her PCP for workup of history GI bleed.  RTC: 3 months.      Simone De Jesus MD.  Note dictated using MModal.

## 2024-05-28 NOTE — PROGRESS NOTES
Faculty Attestation: Sherry Hampton  was seen in Sports Medicine Clinic. Patient chart reviewed. History of Present Illness, Physical Exam, and Assessment and Plan reviewed. Treatment plan is reasonable and appropriate. Compliance with treatment recommendations is important.  Radiology images independently reviewed and agree with radiologist interpretation. No procedure was performed.     Evie Cedeno MD  Sports Medicine

## 2024-07-24 ENCOUNTER — OFFICE VISIT (OUTPATIENT)
Dept: URGENT CARE | Facility: CLINIC | Age: 64
End: 2024-07-24
Payer: MEDICAID

## 2024-07-24 VITALS
HEIGHT: 62 IN | WEIGHT: 166 LBS | BODY MASS INDEX: 30.55 KG/M2 | SYSTOLIC BLOOD PRESSURE: 129 MMHG | DIASTOLIC BLOOD PRESSURE: 88 MMHG | RESPIRATION RATE: 16 BRPM | TEMPERATURE: 98 F | OXYGEN SATURATION: 98 % | HEART RATE: 72 BPM

## 2024-07-24 DIAGNOSIS — G89.29 CHRONIC PRIMARY MUSCULOSKELETAL PAIN: ICD-10-CM

## 2024-07-24 DIAGNOSIS — R05.9 COUGH IN ADULT: Primary | ICD-10-CM

## 2024-07-24 DIAGNOSIS — M79.18 CHRONIC PRIMARY MUSCULOSKELETAL PAIN: ICD-10-CM

## 2024-07-24 PROCEDURE — 99214 OFFICE O/P EST MOD 30 MIN: CPT | Mod: PBBFAC

## 2024-07-24 PROCEDURE — 99214 OFFICE O/P EST MOD 30 MIN: CPT | Mod: S$PBB,,,

## 2024-07-24 PROCEDURE — 63600175 PHARM REV CODE 636 W HCPCS

## 2024-07-24 RX ORDER — PROMETHAZINE HYDROCHLORIDE AND DEXTROMETHORPHAN HYDROBROMIDE 6.25; 15 MG/5ML; MG/5ML
5 SYRUP ORAL NIGHTLY PRN
Qty: 50 ML | Refills: 0 | Status: SHIPPED | OUTPATIENT
Start: 2024-07-24

## 2024-07-24 RX ORDER — KETOROLAC TROMETHAMINE 30 MG/ML
30 INJECTION, SOLUTION INTRAMUSCULAR; INTRAVENOUS
Status: COMPLETED | OUTPATIENT
Start: 2024-07-24 | End: 2024-07-24

## 2024-07-24 RX ORDER — BENZONATATE 200 MG/1
200 CAPSULE ORAL 3 TIMES DAILY PRN
Qty: 30 CAPSULE | Refills: 0 | Status: SHIPPED | OUTPATIENT
Start: 2024-07-24 | End: 2024-08-03

## 2024-07-24 RX ORDER — LORATADINE 10 MG/1
10 TABLET ORAL DAILY
Qty: 30 TABLET | Refills: 0 | Status: SHIPPED | OUTPATIENT
Start: 2024-07-24 | End: 2024-08-23

## 2024-07-24 RX ADMIN — KETOROLAC TROMETHAMINE 30 MG: 30 INJECTION, SOLUTION INTRAMUSCULAR; INTRAVENOUS at 07:07

## 2024-07-25 NOTE — NURSING
Toradol 30 mg administered to Left Gluteal using aseptic technique. Patient observed for 15 minutes for reactions, none noted. Patient tolerated well.

## 2024-07-25 NOTE — PROGRESS NOTES
"Subjective:       Patient ID: Sherry Hampton is a 63 y.o. female.    Vitals:  height is 5' 2" (1.575 m) and weight is 75.3 kg (166 lb). Her oral temperature is 97.5 °F (36.4 °C). Her blood pressure is 129/88 and her pulse is 72. Her respiration is 16 and oxygen saturation is 98%.     Chief Complaint: Cough (Cough x 2 days, patient denies nasal congestion, body aches, fever or sore throat.)    64 y/o white female c/o coughing symptoms intermit since January 2024 after diagnosed with COVID. States she has tried Honey with minima improvement. Reports cough improvement with phenegan DM in the past. Denies any known ill contacts.   Also reports generalized MSK pain, requesting Toradol injection. Denies gi ulcers or kidney impairment.         Constitution: Negative for fever.   HENT:  Negative for postnasal drip.    Cardiovascular:  Negative for chest pain.   Respiratory:  Positive for cough and sputum production. Negative for shortness of breath and asthma.    Allergic/Immunologic: Positive for chronic cough. Negative for asthma.       Objective:      Physical Exam   Constitutional: She is oriented to person, place, and time. She appears well-developed. She is cooperative. She is easily aroused. She does not appear ill. normal and well-groomedawake  HENT:   Head: Normocephalic and atraumatic.   Ears:   Right Ear: Tympanic membrane normal.   Left Ear: Tympanic membrane normal.   Nose: Nose normal.   Mouth/Throat: Uvula is midline, oropharynx is clear and moist and mucous membranes are normal.   Eyes: Lids are normal.   Neck: Neck supple.   Cardiovascular: Normal rate.   Pulmonary/Chest: Effort normal and breath sounds normal.   Abdominal: Normal appearance.   Musculoskeletal:      Right lower leg: No edema.      Left lower leg: No edema.   Lymphadenopathy:     She has no cervical adenopathy.   Neurological: She is alert, oriented to person, place, and time and easily aroused. Gait normal. GCS eye subscore is 4. GCS " verbal subscore is 5. GCS motor subscore is 6.   Skin: Skin is warm, dry and intact. Capillary refill takes less than 2 seconds.   Psychiatric: Her speech is normal and behavior is normal. Her mood appears anxious.   Nursing note and vitals reviewed.        Assessment:       1. Cough in adult    2. Chronic primary musculoskeletal pain          Plan:     Low suspicion of COVID, patient agrees, will opt out of testing today.  reviewed, will prescribe nocturnal short course of Phenergan DM, encouraged to purchase humidifier, continue with honey and warm liquids, sleep with head slightly elevated. Follow up with SALINA Chavez as needed.     Cough in adult  -     loratadine (CLARITIN) 10 mg tablet; Take 1 tablet (10 mg total) by mouth once daily.  Dispense: 30 tablet; Refill: 0  -     benzonatate (TESSALON) 200 MG capsule; Take 1 capsule (200 mg total) by mouth 3 (three) times daily as needed for Cough.  Dispense: 30 capsule; Refill: 0  -     promethazine-dextromethorphan (PROMETHAZINE-DM) 6.25-15 mg/5 mL Syrp; Take 5 mLs by mouth nightly as needed (cough).  Dispense: 50 mL; Refill: 0    Chronic primary musculoskeletal pain  -     ketorolac injection 30 mg

## 2024-07-25 NOTE — ADDENDUM NOTE
Addended by: LUKE WHITMORE on: 7/24/2024 07:41 PM     Modules accepted: Orders, Level of Service

## 2024-08-11 ENCOUNTER — OFFICE VISIT (OUTPATIENT)
Dept: URGENT CARE | Facility: CLINIC | Age: 64
End: 2024-08-11
Payer: MEDICAID

## 2024-08-11 VITALS
DIASTOLIC BLOOD PRESSURE: 88 MMHG | BODY MASS INDEX: 31.75 KG/M2 | TEMPERATURE: 98 F | RESPIRATION RATE: 20 BRPM | HEIGHT: 61 IN | OXYGEN SATURATION: 97 % | HEART RATE: 98 BPM | SYSTOLIC BLOOD PRESSURE: 122 MMHG | WEIGHT: 168.19 LBS

## 2024-08-11 DIAGNOSIS — N39.0 URINARY TRACT INFECTION WITHOUT HEMATURIA, SITE UNSPECIFIED: Primary | ICD-10-CM

## 2024-08-11 DIAGNOSIS — R30.0 DYSURIA: ICD-10-CM

## 2024-08-11 LAB
BILIRUB UR QL STRIP: POSITIVE
GLUCOSE UR QL STRIP: NEGATIVE
KETONES UR QL STRIP: POSITIVE
LEUKOCYTE ESTERASE UR QL STRIP: NEGATIVE
PH, POC UA: 5.5
POC BLOOD, URINE: POSITIVE
POC NITRATES, URINE: POSITIVE
PROT UR QL STRIP: POSITIVE
SP GR UR STRIP: >=1.03 (ref 1–1.03)
UROBILINOGEN UR STRIP-ACNC: ABNORMAL (ref 0.1–1.1)

## 2024-08-11 PROCEDURE — 87186 SC STD MICRODIL/AGAR DIL: CPT | Performed by: FAMILY MEDICINE

## 2024-08-11 PROCEDURE — 99215 OFFICE O/P EST HI 40 MIN: CPT | Mod: PBBFAC | Performed by: FAMILY MEDICINE

## 2024-08-11 PROCEDURE — 87086 URINE CULTURE/COLONY COUNT: CPT | Performed by: FAMILY MEDICINE

## 2024-08-11 PROCEDURE — 99214 OFFICE O/P EST MOD 30 MIN: CPT | Mod: S$PBB,,, | Performed by: FAMILY MEDICINE

## 2024-08-11 PROCEDURE — 81003 URINALYSIS AUTO W/O SCOPE: CPT | Mod: PBBFAC | Performed by: FAMILY MEDICINE

## 2024-08-11 RX ORDER — PHENAZOPYRIDINE HYDROCHLORIDE 200 MG/1
200 TABLET, FILM COATED ORAL EVERY 8 HOURS PRN
Qty: 9 TABLET | Refills: 0 | Status: SHIPPED | OUTPATIENT
Start: 2024-08-11 | End: 2024-08-14

## 2024-08-11 RX ORDER — NITROFURANTOIN 25; 75 MG/1; MG/1
100 CAPSULE ORAL 2 TIMES DAILY
Qty: 14 CAPSULE | Refills: 0 | Status: SHIPPED | OUTPATIENT
Start: 2024-08-11 | End: 2024-08-18

## 2024-08-11 NOTE — PROGRESS NOTES
"Subjective:       Patient ID: Sherry Hampton is a 63 y.o. female.    Vitals:  height is 5' 1" (1.549 m) and weight is 76.3 kg (168 lb 3.4 oz). Her temperature is 97.8 °F (36.6 °C). Her blood pressure is 122/88 and her pulse is 98. Her respiration is 20 and oxygen saturation is 97%.     Chief Complaint: Dysuria (X today) and Urinary Retention (X today)    63-year-old female with a history of chronic musculoskeletal pain presents urgent care with 1 day of urinary urgency, dysuria.  No hematuria.  States she was urinating fine this morning, was able to give a small amount of urine today in clinic.  Denies vaginal discharge or bleeding.  No flank pain.  She has chronic low back pain.  Denies fever or chills.  No vomiting or diarrhea.  It appears that most of her pain is musculoskeletal.  She is also pointing out a plantar wart on the foot.    All other systems are negative    Chart reviewed    Objective:   Physical Exam   Constitutional:  Non-toxic appearance. She does not appear ill. No distress.   Neck: Neck supple.   Abdominal: Normal appearance. She exhibits no distension. flat abdomen There is abdominal tenderness (Mild suprapubic tenderness, no appreciable fullness). There is no rebound, no guarding, no left CVA tenderness and no right CVA tenderness.   Musculoskeletal:      Lumbar back: She exhibits tenderness and spasm (bilateral paralumbar, reproduces symptoms). She exhibits no bony tenderness.   Neurological: no focal deficit. She is alert.   Skin: Skin is warm, dry and not diaphoretic.   Psychiatric: Her behavior is normal. Mood normal.   Nursing note and vitals reviewed.    Results for orders placed or performed in visit on 08/11/24   POCT Urinalysis, Dipstick, Automated, W/O Scope   Result Value Ref Range    POC Blood, Urine Positive (A) Negative    POC Bilirubin, Urine Positive (A) Negative    POC Urobilinogen, Urine 1.0 e.u./dl 0.1 - 1.1    POC Ketones, Urine Positive (A) Negative    POC Protein, Urine " Positive (A) Negative    POC Nitrates, Urine Positive (A) Negative    POC Glucose, Urine Negative Negative    pH, UA 5.5     POC Specific Gravity, Urine >=1.030 1.003 - 1.029    POC Leukocytes, Urine Negative Negative         Assessment:     1. Urinary tract infection without hematuria, site unspecified    2. Dysuria            Plan:   Had a lengthy discussion about potential etiologies of symptoms including early cystitis, urinary retention, etc..  She was able to give more urine toward the end of our visit.  We discussed the options of going to the ER to get a bladder scan.  She would rather try a course of antibiotics along with Pyridium.  We discussed signs and symptoms that should prompt an immediate ER evaluation and she voiced understanding.  She requested a few Norco, but will avoid in this situation.  She understands that she needs to monitor for urinary retention and seek emergency care for worsening pain or inability to urinate.      Urinary tract infection without hematuria, site unspecified  -     Urine culture    Dysuria  -     POCT Urinalysis, Dipstick, Automated, W/O Scope    Other orders  -     nitrofurantoin, macrocrystal-monohydrate, (MACROBID) 100 MG capsule; Take 1 capsule (100 mg total) by mouth 2 (two) times daily. for 7 days  Dispense: 14 capsule; Refill: 0  -     phenazopyridine (PYRIDIUM) 200 MG tablet; Take 1 tablet (200 mg total) by mouth every 8 (eight) hours as needed for Pain.  Dispense: 9 tablet; Refill: 0        Please note: This chart was completed via voice to text dictation. It may contain typographical/word recognition errors. If there are any questions, please contact the provider for final clarification.

## 2024-08-13 ENCOUNTER — TELEPHONE (OUTPATIENT)
Dept: URGENT CARE | Facility: CLINIC | Age: 64
End: 2024-08-13
Payer: MEDICAID

## 2024-08-13 DIAGNOSIS — N30.90 CYSTITIS: Primary | ICD-10-CM

## 2024-08-13 LAB — BACTERIA UR CULT: ABNORMAL

## 2024-08-13 RX ORDER — AMOXICILLIN AND CLAVULANATE POTASSIUM 875; 125 MG/1; MG/1
1 TABLET, FILM COATED ORAL EVERY 12 HOURS
Qty: 14 TABLET | Refills: 0 | Status: SHIPPED | OUTPATIENT
Start: 2024-08-13 | End: 2024-08-20

## 2024-08-13 NOTE — TELEPHONE ENCOUNTER
----- Message from JAMIL Lee sent at 8/13/2024 10:55 AM CDT -----  Please check with Ms. Hampton if UTI symptoms are improving, Macrobid is intermediate on sensitivity report. Will change to Augmentin if symptoms are not improving.     Thanks.

## 2024-08-13 NOTE — TELEPHONE ENCOUNTER
Spoke with patient, she reports some relief but still has symptoms. Advised patient we where sending a different antibiotic to her pharmacy, she voiced understanding.

## 2024-08-13 NOTE — PROGRESS NOTES
Please check with Ms. Hampton if UTI symptoms are improving, Macrobid is intermediate on sensitivity report. Will change to Augmentin if symptoms are not improving.     Thanks.

## 2024-10-01 ENCOUNTER — OFFICE VISIT (OUTPATIENT)
Dept: URGENT CARE | Facility: CLINIC | Age: 64
End: 2024-10-01
Payer: MEDICAID

## 2024-10-01 ENCOUNTER — HOSPITAL ENCOUNTER (OUTPATIENT)
Dept: RADIOLOGY | Facility: HOSPITAL | Age: 64
Discharge: HOME | End: 2024-10-01
Attending: STUDENT IN AN ORGANIZED HEALTH CARE EDUCATION/TRAINING PROGRAM
Payer: MEDICAID

## 2024-10-01 VITALS
HEIGHT: 61 IN | SYSTOLIC BLOOD PRESSURE: 153 MMHG | OXYGEN SATURATION: 100 % | HEART RATE: 77 BPM | RESPIRATION RATE: 20 BRPM | BODY MASS INDEX: 31.53 KG/M2 | WEIGHT: 167 LBS | DIASTOLIC BLOOD PRESSURE: 78 MMHG | TEMPERATURE: 98 F

## 2024-10-01 DIAGNOSIS — R05.3 CHRONIC COUGH: Primary | ICD-10-CM

## 2024-10-01 DIAGNOSIS — R05.3 CHRONIC COUGH: ICD-10-CM

## 2024-10-01 PROCEDURE — 99213 OFFICE O/P EST LOW 20 MIN: CPT | Mod: S$PBB,,, | Performed by: FAMILY MEDICINE

## 2024-10-01 PROCEDURE — 99214 OFFICE O/P EST MOD 30 MIN: CPT | Mod: PBBFAC,25 | Performed by: FAMILY MEDICINE

## 2024-10-01 PROCEDURE — 71046 X-RAY EXAM CHEST 2 VIEWS: CPT | Mod: TC

## 2024-10-01 RX ORDER — ALBUTEROL SULFATE 90 UG/1
2 INHALANT RESPIRATORY (INHALATION) EVERY 6 HOURS PRN
Qty: 18 G | Refills: 3 | Status: SHIPPED | OUTPATIENT
Start: 2024-10-01

## 2024-10-01 RX ORDER — PROMETHAZINE HYDROCHLORIDE AND DEXTROMETHORPHAN HYDROBROMIDE 6.25; 15 MG/5ML; MG/5ML
5 SYRUP ORAL
Qty: 60 ML | Refills: 0 | Status: SHIPPED | OUTPATIENT
Start: 2024-10-01

## 2024-10-01 NOTE — PROGRESS NOTES
"Subjective:       Patient ID: Sherry Hampton is a 63 y.o. female.    Vitals:  height is 5' 1" (1.549 m) and weight is 75.8 kg (167 lb). Her oral temperature is 98 °F (36.7 °C). Her blood pressure is 153/78 (abnormal) and her pulse is 77. Her respiration is 20 and oxygen saturation is 100%.     Chief Complaint: Cough (Chronic cough since having Covid in January) and Back Pain (Low back and pain that was aggravated at physical therapy. )    Patient known to me.  Presents to Urgent Care requesting something for cough, wants to be able to sleep.  States she has been coughing for months.  Also has chronic low back pain.  No new symptoms.  Denies fever.  No wheezing.  Minimal sputum production.  No chest discomfort.  She was a smoker.  She has a PCP.     reviewed    All other systems are negative    Chart reviewed    Objective:   Physical Exam   Constitutional: She appears well-developed.  Non-toxic appearance. She does not appear ill. No distress.   Cardiovascular: Regular rhythm.   Pulmonary/Chest: Effort normal and breath sounds normal.   Abdominal: She exhibits no distension. Soft. There is no abdominal tenderness.   Musculoskeletal: Normal range of motion.         General: Normal range of motion.   Skin: Skin is warm, dry and not diaphoretic.   Nursing note and vitals reviewed.        Assessment:     1. Chronic cough            Plan:   Will notify of any significant abnormalities on radiology interpretation of chest x-ray.  We had a lengthy discussion about cough, pain, and the importance of following up with her PCP to address chronic medical conditions.  I agreed to give a small bottle of Phenergan DM with side effect precautions.  I would be happy to see her for any acute problems.      Chronic cough  -     XR CHEST PA AND LATERAL; Future; Expected date: 10/01/2024    Other orders  -     albuterol (VENTOLIN HFA) 90 mcg/actuation inhaler; Inhale 2 puffs into the lungs every 6 (six) hours as needed for " Wheezing. Rescue  Dispense: 18 g; Refill: 3  -     promethazine-dextromethorphan (PROMETHAZINE-DM) 6.25-15 mg/5 mL Syrp; Take 5 mLs by mouth every 6 to 8 hours as needed (cough). May cause sedation.  Do not drive or operate heavy machinery.  Dispense: 60 mL; Refill: 0        Please note: This chart was completed via voice to text dictation. It may contain typographical/word recognition errors. If there are any questions, please contact the provider for final clarification.

## 2024-12-19 ENCOUNTER — OFFICE VISIT (OUTPATIENT)
Dept: URGENT CARE | Facility: CLINIC | Age: 64
End: 2024-12-19
Payer: MEDICAID

## 2024-12-19 VITALS
BODY MASS INDEX: 31.98 KG/M2 | RESPIRATION RATE: 18 BRPM | TEMPERATURE: 98 F | DIASTOLIC BLOOD PRESSURE: 84 MMHG | WEIGHT: 169.38 LBS | SYSTOLIC BLOOD PRESSURE: 135 MMHG | HEIGHT: 61 IN | OXYGEN SATURATION: 98 % | HEART RATE: 84 BPM

## 2024-12-19 DIAGNOSIS — K08.89 PAIN, DENTAL: Primary | ICD-10-CM

## 2024-12-19 PROCEDURE — 99213 OFFICE O/P EST LOW 20 MIN: CPT | Mod: S$PBB,,,

## 2024-12-19 PROCEDURE — 99214 OFFICE O/P EST MOD 30 MIN: CPT | Mod: PBBFAC

## 2024-12-19 RX ORDER — LIDOCAINE HYDROCHLORIDE 20 MG/ML
SOLUTION OROPHARYNGEAL
Qty: 100 ML | Refills: 0 | Status: SHIPPED | OUTPATIENT
Start: 2024-12-19 | End: 2024-12-29

## 2024-12-19 RX ORDER — HYDROCODONE BITARTRATE AND ACETAMINOPHEN 5; 325 MG/1; MG/1
1 TABLET ORAL EVERY 8 HOURS PRN
Qty: 15 TABLET | Refills: 0 | Status: SHIPPED | OUTPATIENT
Start: 2024-12-19 | End: 2024-12-24

## 2024-12-19 RX ORDER — LIDOCAINE HYDROCHLORIDE 20 MG/ML
SOLUTION OROPHARYNGEAL
Qty: 100 ML | Refills: 0 | Status: SHIPPED | OUTPATIENT
Start: 2024-12-19 | End: 2024-12-19

## 2024-12-19 RX ORDER — AMOXICILLIN 875 MG/1
875 TABLET, FILM COATED ORAL EVERY 12 HOURS
Qty: 20 TABLET | Refills: 0 | Status: SHIPPED | OUTPATIENT
Start: 2024-12-19 | End: 2024-12-29

## 2024-12-19 NOTE — PROGRESS NOTES
"Subjective:       Patient ID: Sherry Hampton is a 63 y.o. female.    Vitals:  height is 5' 1" (1.549 m) and weight is 76.8 kg (169 lb 6.4 oz). Her temperature is 97.9 °F (36.6 °C). Her blood pressure is 135/84 and her pulse is 84. Her respiration is 18 and oxygen saturation is 98%.     Chief Complaint: Dental Pain (Dental pain x 3 weeks. )    63-year-old female presents to the clinic with complaints of dental pain x3 weeks.  Patient complains of dental pain to her left upper back two molars.  Patient states she currently does not have dental insurance and can not afford to get her teeth fixed, but we will have dental insurance with the new year and plans on having dental work done after that.  Patient is given a list of dental providers that are low cost or self pay and encouraged to make a dental appointment as soon as she possibly can.  Patient states she has taken over-the-counter Tylenol and ibuprofen with no relief.  Patient denies fever, nausea, vomiting, abdominal pain, headache, diarrhea, chills, body aches.    All other systems are negative    Chart reviewed    Objective:   Physical Exam   Constitutional: She appears well-developed.  Non-toxic appearance. She does not appear ill. No distress.   HENT:   Head: Normocephalic and atraumatic.   Ears:   Right Ear: Hearing, tympanic membrane, external ear and ear canal normal.   Left Ear: Hearing, tympanic membrane, external ear and ear canal normal.   Nose: Mucosal edema and rhinorrhea present.   Mouth/Throat: Oropharynx is clear and moist. Abnormal dentition. Dental abscesses and dental caries present.   Cardiovascular: Regular rhythm.   Pulmonary/Chest: Effort normal and breath sounds normal.   Abdominal: She exhibits no distension. Soft. There is no abdominal tenderness.   Musculoskeletal: Normal range of motion.         General: Normal range of motion.   Skin: Skin is warm, dry and not diaphoretic.   Nursing note and vitals reviewed.        Assessment: "     1. Pain, dental            Plan:     Take antibiotics as directed. Do not stop taking them just because you feel better. You need to take the full course of antibiotics  Brush and floss gently.  Reduce pain and swelling in your face and jaw by putting ice or a cold pack on the outside of your cheek. Do this for 10 to 20 minutes at a time. Put a thin cloth between the ice and your skin.  Avoid using tobacco products. Tobacco and nicotine slow your ability to heal.  Take pain medicines exactly as directed.  If the doctor gave you a prescription medicine for pain, take it as prescribed.  If you are not taking a prescription pain medicine, ask your doctor if you can take an over-the-counter medicine such as acetaminophen (Tylenol) or ibuprofen (Advil or Motrin). Be safe with medicines. Read and follow all instructions on the label.  seek immediate medical care if:  You have worsening signs of infection, such as:  Increased pain, swelling, warmth, or redness.  Red streaks leading from the area.  Pus draining from the area.  A fever.  You do not get better as expected      Pain, dental  -     amoxicillin (AMOXIL) 875 MG tablet; Take 1 tablet (875 mg total) by mouth every 12 (twelve) hours. for 10 days  Dispense: 20 tablet; Refill: 0  -     Discontinue: LIDOcaine viscous HCl 2% (LIDOCAINE VISCOUS) 2 % Soln; by Mucous Membrane route every 3 (three) hours. for 10 days  Dispense: 100 mL; Refill: 0  -     HYDROcodone-acetaminophen (NORCO) 5-325 mg per tablet; Take 1 tablet by mouth every 8 (eight) hours as needed for Pain.  Dispense: 15 tablet; Refill: 0  -     LIDOcaine viscous HCl 2% (LIDOCAINE VISCOUS) 2 % Soln; by Mucous Membrane route every 3 (three) hours. Apply 2ml to mucous membrane every 3 hours as needed for dental pain for 10 days  Dispense: 100 mL; Refill: 0        Please note: This chart was completed via voice to text dictation. It may contain typographical/word recognition errors. If there are any questions,  please contact the provider for final clarification.

## 2024-12-26 ENCOUNTER — TELEPHONE (OUTPATIENT)
Dept: URGENT CARE | Facility: CLINIC | Age: 64
End: 2024-12-26
Payer: MEDICAID

## 2024-12-26 DIAGNOSIS — K04.7 DENTAL ABSCESS: Primary | ICD-10-CM

## 2024-12-26 RX ORDER — AZITHROMYCIN 250 MG/1
TABLET, FILM COATED ORAL
Qty: 6 TABLET | Refills: 0 | Status: SHIPPED | OUTPATIENT
Start: 2024-12-26 | End: 2024-12-31

## 2024-12-26 NOTE — TELEPHONE ENCOUNTER
Spoke to Ms. Hampton on the phone and she stated that the amoxicillin was causing stomach upset and nausea.  Patient states she has tried to take amoxicillin with food and milk but is still having nausea and stomach upset discussed that we will change antibiotic to azithromycin and patient agrees to this plan.  Medications sent to patient's pharmacy.

## 2025-04-29 ENCOUNTER — OFFICE VISIT (OUTPATIENT)
Dept: URGENT CARE | Facility: CLINIC | Age: 65
End: 2025-04-29
Payer: MEDICARE

## 2025-04-29 VITALS
BODY MASS INDEX: 31.26 KG/M2 | RESPIRATION RATE: 18 BRPM | HEART RATE: 76 BPM | OXYGEN SATURATION: 95 % | TEMPERATURE: 98 F | DIASTOLIC BLOOD PRESSURE: 79 MMHG | HEIGHT: 62 IN | SYSTOLIC BLOOD PRESSURE: 123 MMHG | WEIGHT: 169.88 LBS

## 2025-04-29 DIAGNOSIS — M54.50 ACUTE LEFT-SIDED LOW BACK PAIN WITHOUT SCIATICA: Primary | ICD-10-CM

## 2025-04-29 PROCEDURE — 99213 OFFICE O/P EST LOW 20 MIN: CPT | Mod: S$PBB,,,

## 2025-04-29 PROCEDURE — 63600175 PHARM REV CODE 636 W HCPCS: Mod: JZ,TB

## 2025-04-29 PROCEDURE — 99214 OFFICE O/P EST MOD 30 MIN: CPT | Mod: PBBFAC

## 2025-04-29 RX ORDER — HYDROCODONE BITARTRATE AND ACETAMINOPHEN 10; 325 MG/1; MG/1
1 TABLET ORAL EVERY 6 HOURS PRN
Qty: 12 TABLET | Refills: 0 | Status: SHIPPED | OUTPATIENT
Start: 2025-04-29 | End: 2025-05-02

## 2025-04-29 RX ORDER — KETOROLAC TROMETHAMINE 30 MG/ML
30 INJECTION, SOLUTION INTRAMUSCULAR; INTRAVENOUS
Status: COMPLETED | OUTPATIENT
Start: 2025-04-29 | End: 2025-04-29

## 2025-04-29 RX ORDER — DEXAMETHASONE SODIUM PHOSPHATE 4 MG/ML
4 INJECTION, SOLUTION INTRA-ARTICULAR; INTRALESIONAL; INTRAMUSCULAR; INTRAVENOUS; SOFT TISSUE
Status: COMPLETED | OUTPATIENT
Start: 2025-04-29 | End: 2025-04-29

## 2025-04-29 RX ADMIN — KETOROLAC TROMETHAMINE 30 MG: 60 INJECTION, SOLUTION INTRAMUSCULAR at 01:04

## 2025-04-29 RX ADMIN — DEXAMETHASONE SODIUM PHOSPHATE 4 MG: 4 INJECTION, SOLUTION INTRA-ARTICULAR; INTRALESIONAL; INTRAMUSCULAR; INTRAVENOUS; SOFT TISSUE at 01:04

## 2025-04-29 NOTE — PROGRESS NOTES
"Subjective:       Patient ID: Sherry Hampton is a 64 y.o. female.    Vitals:  height is 5' 1.5" (1.562 m) and weight is 77.1 kg (169 lb 14.4 oz). Her oral temperature is 97.9 °F (36.6 °C). Her blood pressure is 123/79 and her pulse is 76. Her respiration is 18 and oxygen saturation is 95%.     Chief Complaint: Back Pain (Mid back down to back/butt/pelvis & hip joint/groin)    64-year-old female reports to the clinic with complaints of lumbar back pain that radiates down her back to her buttocks and pelvis and stops in the hip joint.  Patient states that she was seen by Orthopedics at [facility] in the past, and is currently trying to find into the orthopedic doctor who will help with her back pain.  Patient states she has not been taking her meloxicam and is requesting a Toradol shot at today's visit.    All other systems are negative    Chart reviewed    Objective:   Physical Exam   Constitutional: She appears well-developed.  Non-toxic appearance. She does not appear ill. No distress.   Cardiovascular: Regular rhythm.   Pulmonary/Chest: Effort normal and breath sounds normal.   Abdominal: Normal appearance. She exhibits no distension. Soft. There is no abdominal tenderness.   Musculoskeletal:      Left hip: She exhibits decreased range of motion, decreased strength, tenderness and crepitus.      Lumbar back: She exhibits decreased range of motion, tenderness, swelling and spasm.   Neurological: no focal deficit. She is alert. She displays no weakness.   Skin: Skin is warm, dry and not diaphoretic. Capillary refill takes less than 2 seconds.   Psychiatric: Her behavior is normal. Mood normal.   Nursing note and vitals reviewed.      Assessment:     1. Acute left-sided low back pain without sciatica            Plan:     Please read all educational materials and discharge instructions carefully  We will give Toradol and dexamethasone injections at today's visit  Begin taking Norco as needed for pain  Discussed signs and " symptoms of worsening infection that would warrant further evaluation in the ER  Please read patient education material.  Please take medications as discussed and consider potential side effects. Consider heat, over-the-counter topical analgesics, stretches.    Return to urgent care if symptoms are not improving in 3-5 days, immediately if new or worsening symptoms develop.  Follow up with your primary provider.     Acute left-sided low back pain without sciatica  -     ketorolac injection 30 mg  -     dexAMETHasone injection 4 mg  -     HYDROcodone-acetaminophen (NORCO)  mg per tablet; Take 1 tablet by mouth every 6 (six) hours as needed for Pain.  Dispense: 12 tablet; Refill: 0        Please note: This chart was completed via voice to text dictation. It may contain typographical/word recognition errors. If there are any questions, please contact the provider for final clarification.

## 2025-07-15 ENCOUNTER — OFFICE VISIT (OUTPATIENT)
Dept: URGENT CARE | Facility: CLINIC | Age: 65
End: 2025-07-15
Payer: MEDICARE

## 2025-07-15 VITALS
SYSTOLIC BLOOD PRESSURE: 120 MMHG | TEMPERATURE: 98 F | OXYGEN SATURATION: 98 % | HEART RATE: 81 BPM | DIASTOLIC BLOOD PRESSURE: 86 MMHG | BODY MASS INDEX: 31.47 KG/M2 | HEIGHT: 62 IN | RESPIRATION RATE: 20 BRPM | WEIGHT: 171 LBS

## 2025-07-15 DIAGNOSIS — S76.819A STRAIN OF RECTUS FEMORIS MUSCLE: Primary | ICD-10-CM

## 2025-07-15 PROCEDURE — 99213 OFFICE O/P EST LOW 20 MIN: CPT | Mod: S$PBB,,, | Performed by: NURSE PRACTITIONER

## 2025-07-15 PROCEDURE — 99214 OFFICE O/P EST MOD 30 MIN: CPT | Mod: PBBFAC | Performed by: NURSE PRACTITIONER

## 2025-07-15 PROCEDURE — 63600175 PHARM REV CODE 636 W HCPCS: Mod: JZ,TB

## 2025-07-15 RX ORDER — KETOROLAC TROMETHAMINE 30 MG/ML
30 INJECTION, SOLUTION INTRAMUSCULAR; INTRAVENOUS
Status: COMPLETED | OUTPATIENT
Start: 2025-07-15 | End: 2025-07-15

## 2025-07-15 RX ORDER — BACLOFEN 10 MG/1
10 TABLET ORAL 3 TIMES DAILY
Qty: 21 TABLET | Refills: 0 | Status: SHIPPED | OUTPATIENT
Start: 2025-07-15 | End: 2025-07-22

## 2025-07-15 RX ADMIN — KETOROLAC TROMETHAMINE 30 MG: 30 INJECTION, SOLUTION INTRAMUSCULAR at 04:07

## 2025-07-15 NOTE — PROGRESS NOTES
"Subjective:      Patient ID: Sherry Hampton is a 64 y.o. female.    Vitals:  height is 5' 2" (1.575 m) and weight is 77.6 kg (171 lb). Her temperature is 98.1 °F (36.7 °C). Her blood pressure is 120/86 and her pulse is 81. Her respiration is 20 and oxygen saturation is 98%.     Chief Complaint: Hip Pain (LT (chronic))    HPI Patient presents with left groin pain and hip pain over the last week, pain does not radiate.  Patient reports known history of sciatica in which she stopped physical therapy about 1 year ago because of the pain.  Patient reports being prescribed diclofenac, Mobic, Flexeril but does not take it as prescribed. Patient states that she usually takes Norco because of stomach issues, And does not take Flexeril because it makes her sleepy. Patient denies recent fall, or injury but states that she has stairs in her home in which she has to walk up these and this exacerbates the pain.  Patient denies fever, shortness for breath or chest pain, dizziness, weakness, numbness and tingling her extremity, dysuria.  ROS   Objective:     Physical Exam   Constitutional: She is oriented to person, place, and time. She appears well-developed.  Non-toxic appearance. She does not appear ill. No distress.   HENT:   Head: Atraumatic.   Nose: No purulent discharge. Right sinus exhibits no maxillary sinus tenderness and no frontal sinus tenderness. Left sinus exhibits no maxillary sinus tenderness and no frontal sinus tenderness.   Mouth/Throat: Uvula is midline.   Eyes: Right eye exhibits no discharge. Left eye exhibits no discharge. Extraocular movement intact   Neck: Neck supple. No neck rigidity present.   Cardiovascular: Regular rhythm.   Pulmonary/Chest: Effort normal and breath sounds normal. No respiratory distress. She has no wheezes. She has no rhonchi. She has no rales.   Musculoskeletal:        Legs:    Lymphadenopathy:     She has no cervical adenopathy.   Neurological: She is alert and oriented to " person, place, and time.   Skin: Skin is warm, dry and not diaphoretic. Capillary refill takes less than 2 seconds.   Psychiatric: Her behavior is normal. Mood, judgment and thought content normal.   Nursing note and vitals reviewed.      Assessment:     1. Strain of rectus femoris muscle        Plan:   Precautions given and discussed   Toradol IM x1 dose given for pain today prescribed baclofen as alternatives his muscle relaxer of Flexeril due to sedating properties  Patient encouraged to keep appointment with orthopedic specialists in 1 week  For soreness, muscle recovery soak in a warm [employer] salt bath. Pour in tub of warm water and soak for 10-20 minutes.  **Light range of motion of the affected muscles.  **Roll muscle spasm on Lacrosse ball or tennis ball  **Heating pad     Strain of rectus femoris muscle  -     ketorolac injection 30 mg  -     baclofen (LIORESAL) 10 MG tablet; Take 1 tablet (10 mg total) by mouth 3 (three) times daily. for 7 days  Dispense: 21 tablet; Refill: 0

## 2025-07-15 NOTE — PATIENT INSTRUCTIONS
Please follow instructions on patient education material.      Return to urgent care in 2 to 3 days if symptoms are not improving, immediately if you develop any new or worsening symptoms.     Medications may not resolve your issue.  For soreness, muscle recovery soak in a warm Epsom salt bath. Pour in tub of warm water and soak for 10-20 minutes.  **Light range of motion of the affected muscles.  **Roll muscle spasm on Lacrosse ball or tennis ball  **Heating pad   Please see the included patient education for guidance on exercises and stretches.  If home routine and medications do not help your pain, please consider seeing a chiropractor, massage therapist or physical therapist even 1 time for manual manipulation.     You received a Toradol injection in clinic - do not take NSAIDs (ibuprofen, naproxen, Advil, Motrin, Aleve) for the next 8 hours.     Take Rx medications as prescribed and use only when needed, not on a schedule.    You need to follow up with a PCP to properly treat your ongoing back pain.    Use good body mechanics when working/lifting. Stretch your abdominals, hamstrings, buttocks, and thighs frequently. Drink plenty of water.